# Patient Record
Sex: MALE | Race: WHITE | NOT HISPANIC OR LATINO | Employment: FULL TIME | URBAN - NONMETROPOLITAN AREA
[De-identification: names, ages, dates, MRNs, and addresses within clinical notes are randomized per-mention and may not be internally consistent; named-entity substitution may affect disease eponyms.]

---

## 2017-02-01 ENCOUNTER — NON-PROVIDER VISIT (OUTPATIENT)
Dept: URGENT CARE | Facility: PHYSICIAN GROUP | Age: 27
End: 2017-02-01

## 2017-02-01 DIAGNOSIS — Z02.1 PRE-EMPLOYMENT DRUG SCREENING: ICD-10-CM

## 2017-02-01 LAB
AMP AMPHETAMINE: NORMAL
COC COCAINE: NORMAL
INT CON NEG: NEGATIVE
INT CON POS: POSITIVE
MET METHAMPHETAMINES: NORMAL
OPI OPIATES: NORMAL
PCP PHENCYCLIDINE: NORMAL
POC DRUG COMMENT 753798-OCCUPATIONAL HEALTH: NORMAL
THC: NORMAL

## 2017-02-01 PROCEDURE — 80305 DRUG TEST PRSMV DIR OPT OBS: CPT | Performed by: PHYSICIAN ASSISTANT

## 2017-03-08 ENCOUNTER — NON-PROVIDER VISIT (OUTPATIENT)
Dept: URGENT CARE | Facility: PHYSICIAN GROUP | Age: 27
End: 2017-03-08

## 2017-03-08 DIAGNOSIS — Z00.8 ENCOUNTER FOR PULMONARY FUNCTION TESTING: ICD-10-CM

## 2017-03-08 DIAGNOSIS — Z01.10 ENCOUNTER FOR HEARING EXAMINATION: ICD-10-CM

## 2017-03-08 DIAGNOSIS — Z23 NEED FOR HEPATITIS B VACCINATION: ICD-10-CM

## 2017-03-08 PROCEDURE — 94010 BREATHING CAPACITY TEST: CPT | Performed by: PHYSICIAN ASSISTANT

## 2017-03-08 PROCEDURE — 99203 OFFICE O/P NEW LOW 30 MIN: CPT | Performed by: PHYSICIAN ASSISTANT

## 2017-03-08 PROCEDURE — 90746 HEPB VACCINE 3 DOSE ADULT IM: CPT | Performed by: PHYSICIAN ASSISTANT

## 2017-03-08 PROCEDURE — 92553 AUDIOMETRY AIR & BONE: CPT | Performed by: PHYSICIAN ASSISTANT

## 2017-03-08 NOTE — MR AVS SNAPSHOT
Jony Begum Mary   3/8/2017 10:30 AM   Non-Provider Visit   MRN: [id number]    Department:  [facility]   Dept Phone:  [phone]    Description:  Male : 1990   Provider:  Isela Lay PA-C           Reason for Visit     Other           Allergies as of 3/8/2017     No Known Allergies      You were diagnosed with     Encounter for hearing examination   [722480]       Encounter for pulmonary function testing   [[id number]]   Spirometry only.    Need for hepatitis B vaccination   [[id number]]         Vital Signs     Smoking Status                   Current Some Day Smoker           Basic Information     Date Of Birth Sex Race Ethnicity Preferred Language    1990 Male White Non- English      Health Maintenance        Date Due Completion Dates    IMM DTaP/Tdap/Td Vaccine (1 - Tdap) 2007 ---    IMM INFLUENZA (1) 2016 ---            Current Immunizations     Hepatitis B Vaccine Recombivax (Adol/Adult) 3/8/2017      Below and/or attached are the medications your provider expects you to take. Review all of your home medications and newly ordered medications with your provider and/or pharmacist. Follow medication instructions as directed by your provider and/or pharmacist. Please keep your medication list with you and share with your provider. Update the information when medications are discontinued, doses are changed, or new medications (including over-the-counter products) are added; and carry medication information at all times in the event of emergency situations     Allergies:  No Known Allergies          Medications  Valid as of: 2017 -  1:41 PM    Generic Name Brand Name Tablet Size Instructions for use    .                 Medicines prescribed today were sent to:     [facility] [address] [place] [address]    Phone: [phone] Fax: [phone]    Open 24 Hours?: No      Medication  refill instructions:       If your prescription bottle indicates you have medication refills left, it is not necessary to call your provider’s office. Please contact your pharmacy and they will refill your medication.    If your prescription bottle indicates you do not have any refills left, you may request refills at any time through one of the following ways: The online [employer] system (except Urgent Care), by calling your provider’s office, or by asking your pharmacy to contact your provider’s office with a refill request. Medication refills are processed only during regular business hours and may not be available until the next business day. Your provider may request additional information or to have a follow-up visit with you prior to refilling your medication.   *Please Note: Medication refills are assigned a new Rx number when refilled electronically. Your pharmacy may indicate that no refills were authorized even though a new prescription for the same medication is available at the pharmacy. Please request the medicine by name with the pharmacy before contacting your provider for a refill.           [employer] Access Code: [email or web address]  Expires: 4/7/2017  1:41 PM    [employer]  A secure, online tool to manage your health information     [facility]’s [employer]® is a secure, online tool that connects you to your personalized health information from the privacy of your home -- day or night - making it very easy for you to manage your healthcare. Once the activation process is completed, you can even access your medical information using the [employer] kimberly, which is available for free in the Apple Kimberly store or Google Play store.     [employer] provides the following levels of access (as shown below):   My Chart Features   [facility] Doctor [facility]  Specialists [facility]  Urgent  Care Non-[facility]  Primary Care  Doctor   Email your healthcare team securely and privately 24/7 X X X    Manage appointments: schedule  your next appointment; view details of past/upcoming appointments X      Request prescription refills. X      View recent personal medical records, including lab and immunizations X X X X   View health record, including health history, allergies, medications X X X X   Read reports about your outpatient visits, procedures, consult and ER notes X X X X   See your discharge summary, which is a recap of your hospital and/or ER visit that includes your diagnosis, lab results, and care plan. X X       How to register for [employer]:  1. Go to  [email or web address].  2. Click on the Sign Up Now box, which takes you to the New Member Sign Up page. You will need to provide the following information:  a. Enter your [employer] Access Code exactly as it appears at the top of this page. (You will not need to use this code after you’ve completed the sign-up process. If you do not sign up before the expiration date, you must request a new code.)   b. Enter your date of birth.   c. Enter your home email address.   d. Click Submit, and follow the next screen’s instructions.  3. Create a [employer] ID. This will be your [employer] login ID and cannot be changed, so think of one that is secure and easy to remember.  4. Create a [employer] password. You can change your password at any time.  5. Enter your Password Reset Question and Answer. This can be used at a later time if you forget your password.   6. Enter your e-mail address. This allows you to receive e-mail notifications when new information is available in [employer].  7. Click Sign Up. You can now view your health information.    For assistance activating your [employer] account, call [phone]

## 2017-03-08 NOTE — PROGRESS NOTES
No chief complaint on file.      HISTORY OF PRESENT ILLNESS: Patient is a 26 y.o. male who presents today for audiometric testing and spirometry only. Patient denies any issues at this time. Patient does report a history of hearing loss in both ears.    There are no active problems to display for this patient.      Allergies:Review of patient's allergies indicates no known allergies.    No current Epic-ordered outpatient prescriptions on file.     No current Epic-ordered facility-administered medications on file.       No past medical history on file.    Social History   Substance Use Topics   • Smoking status: Current Some Day Smoker -- 0.25 packs/day     Types: Cigarettes   • Smokeless tobacco: Not on file   • Alcohol Use: Yes      Comment: Rarely       Family Status   Relation Status Death Age   • Mother Alive    • Father Alive    No family history on file.    ROS:   Review of Systems   Constitutional: Negative for fever, chills, weight loss and malaise/fatigue.   HENT: Negative for ear pain, nosebleeds, congestion, sore throat and neck pain.    Eyes: Negative for blurred vision.   Respiratory: Negative for cough, sputum production, shortness of breath and wheezing.    Cardiovascular: Negative for chest pain, palpitations, orthopnea and leg swelling.   Gastrointestinal: Negative for heartburn, nausea, vomiting and abdominal pain.   Genitourinary: Negative for dysuria, urgency and frequency.       Exam:  There were no vitals taken for this visit.  General: Normal appearing. No distress.  HEENT:  Bilateral EACs and TMs are within normal limits.  Pulmonary: Clear to ausculation and percussion.  Normal effort. No rales, ronchi, or wheezing.   Psych: Normal mood. Alert and oriented x3. Judgment and insight is normal.    Audiometric testing, per my interpretation: High frequency hearing loss noted in both ears. No significant change from last year's testing. Recommend wearing ear protection about 85 dB but also with  any chronic noise.    Spirometry, per my interpretation: Acceptable range,  no accommodation needed.    Assessment/Plan:  Clear for work with the above recommendations.  1. Encounter for hearing examination     2. Encounter for pulmonary function testing      Spirometry only.

## 2017-08-02 ENCOUNTER — OCCUPATIONAL MEDICINE (OUTPATIENT)
Dept: URGENT CARE | Facility: PHYSICIAN GROUP | Age: 27
End: 2017-08-02

## 2017-08-02 DIAGNOSIS — Z01.10 ENCOUNTER FOR HEARING EVALUATION: ICD-10-CM

## 2017-08-02 PROCEDURE — 92553 AUDIOMETRY AIR & BONE: CPT | Performed by: PHYSICIAN ASSISTANT

## 2017-08-02 NOTE — PROGRESS NOTES
Patient here for repeat of audiogram testing    He denies notable change in hearing. He mentions that occasionally things seemed slightly softer and left ear. He states this has been baseline for long-term. He notes his left ear does have a propensity to build up ear wax. He denies ear pain. Denies fever, chills.    He states that he always uses her protection at work.    Audiogram shows slight change with slight decrease and left ear hearing. No change in right ear. There is no significant threshold shift.    Exam of bilateral ears is normal. External auditory canal bilaterally clear with no cerumen debris or edema. TM slightly bulging on left non-erythematous bilaterally.     Continue current annual follow-up for repeat audiogram. Continue use of ear protection for all loud noises.

## 2017-08-02 NOTE — MR AVS SNAPSHOT
Jony Begum Mary   2017 1:45 PM   Appointment   MRN: [id number]    Department:  [facility]   Dept Phone:  [phone]    Description:  Male : 1990   Provider:  Jaskaran Garcia PA-C           Allergies as of 2017     No Known Allergies      Vital Signs     Smoking Status                   Current Some Day Smoker           Basic Information     Date Of Birth Sex Race Ethnicity Preferred Language    1990 Male White Non- English      Health Maintenance        Date Due Completion Dates    IMM DTaP/Tdap/Td Vaccine (1 - Tdap) 2007 ---    IMM INFLUENZA (1) 2017 ---            Current Immunizations     Hepatitis B Vaccine Recombivax (Adol/Adult) 3/8/2017      Below and/or attached are the medications your provider expects you to take. Review all of your home medications and newly ordered medications with your provider and/or pharmacist. Follow medication instructions as directed by your provider and/or pharmacist. Please keep your medication list with you and share with your provider. Update the information when medications are discontinued, doses are changed, or new medications (including over-the-counter products) are added; and carry medication information at all times in the event of emergency situations     Allergies:  No Known Allergies          Medications  Valid as of: 2017 -  2:09 PM    Generic Name Brand Name Tablet Size Instructions for use    .                 Medicines prescribed today were sent to:     [facility] [address] [place] [id number]    Phone: [phone] Fax: [phone]    Open 24 Hours?: No      Medication refill instructions:       If your prescription bottle indicates you have medication refills left, it is not necessary to call your provider’s office. Please contact your pharmacy and they will refill your medication.    If your prescription bottle  indicates you do not have any refills left, you may request refills at any time through one of the following ways: The online [employer] system (except Urgent Care), by calling your provider’s office, or by asking your pharmacy to contact your provider’s office with a refill request. Medication refills are processed only during regular business hours and may not be available until the next business day. Your provider may request additional information or to have a follow-up visit with you prior to refilling your medication.   *Please Note: Medication refills are assigned a new Rx number when refilled electronically. Your pharmacy may indicate that no refills were authorized even though a new prescription for the same medication is available at the pharmacy. Please request the medicine by name with the pharmacy before contacting your provider for a refill.           [employer] Access Code: [email or web address]  Expires: 9/1/2017  2:09 PM    [employer]  A secure, online tool to manage your health information     [employer]’s [employer]® is a secure, online tool that connects you to your personalized health information from the privacy of your home -- day or night - making it very easy for you to manage your healthcare. Once the activation process is completed, you can even access your medical information using the [employer] kimberly, which is available for free in the [employer] store or Google Play store.     [employer] provides the following levels of access (as shown below):   My Chart Features   [facility] Doctor [facility]  Specialists [facility]  Urgent  Care Non-[facility]  Primary Care  Doctor   Email your healthcare team securely and privately 24/7 X X X    Manage appointments: schedule your next appointment; view details of past/upcoming appointments X      Request prescription refills. X      View recent personal medical records, including lab and immunizations X X X X   View health record, including health history, allergies,  medications X X X X   Read reports about your outpatient visits, procedures, consult and ER notes X X X X   See your discharge summary, which is a recap of your hospital and/or ER visit that includes your diagnosis, lab results, and care plan. X X       How to register for [employer]:  1. Go to  [email or web address].  2. Click on the Sign Up Now box, which takes you to the New Member Sign Up page. You will need to provide the following information:  a. Enter your [employer] Access Code exactly as it appears at the top of this page. (You will not need to use this code after you’ve completed the sign-up process. If you do not sign up before the expiration date, you must request a new code.)   b. Enter your date of birth.   c. Enter your home email address.   d. Click Submit, and follow the next screen’s instructions.  3. Create a [employer] ID. This will be your [employer] login ID and cannot be changed, so think of one that is secure and easy to remember.  4. Create a [employer] password. You can change your password at any time.  5. Enter your Password Reset Question and Answer. This can be used at a later time if you forget your password.   6. Enter your e-mail address. This allows you to receive e-mail notifications when new information is available in [employer].  7. Click Sign Up. You can now view your health information.    For assistance activating your [employer] account, call [phone]

## 2018-01-02 ENCOUNTER — NON-PROVIDER VISIT (OUTPATIENT)
Dept: OCCUPATIONAL MEDICINE | Facility: CLINIC | Age: 28
End: 2018-01-02

## 2018-01-02 DIAGNOSIS — Z02.1 PRE-EMPLOYMENT DRUG SCREENING: ICD-10-CM

## 2018-01-02 PROCEDURE — 80305 DRUG TEST PRSMV DIR OPT OBS: CPT | Performed by: INTERNAL MEDICINE

## 2018-01-11 LAB
AMP AMPHETAMINE: NORMAL
COC COCAINE: NORMAL
INT CON NEG: NORMAL
INT CON POS: NORMAL
MET METHAMPHETAMINES: NORMAL
OPI OPIATES: NORMAL
PCP PHENCYCLIDINE: NORMAL
POC DRUG COMMENT 753798-OCCUPATIONAL HEALTH: NEGATIVE
THC: NORMAL

## 2018-01-24 ENCOUNTER — NON-PROVIDER VISIT (OUTPATIENT)
Dept: OCCUPATIONAL MEDICINE | Facility: CLINIC | Age: 28
End: 2018-01-24

## 2018-01-24 DIAGNOSIS — Z02.1 PRE-EMPLOYMENT DRUG SCREENING: ICD-10-CM

## 2018-01-24 PROCEDURE — 80305 DRUG TEST PRSMV DIR OPT OBS: CPT | Performed by: INTERNAL MEDICINE

## 2018-01-24 PROCEDURE — 8895 PB URINE 6 PANEL AFTER HOURS: Performed by: INTERNAL MEDICINE

## 2019-01-09 ENCOUNTER — OFFICE VISIT (OUTPATIENT)
Dept: URGENT CARE | Facility: PHYSICIAN GROUP | Age: 29
End: 2019-01-09
Payer: MEDICAID

## 2019-01-09 VITALS
BODY MASS INDEX: 29.2 KG/M2 | HEART RATE: 79 BPM | RESPIRATION RATE: 16 BRPM | OXYGEN SATURATION: 98 % | DIASTOLIC BLOOD PRESSURE: 72 MMHG | SYSTOLIC BLOOD PRESSURE: 118 MMHG | TEMPERATURE: 97.9 F | WEIGHT: 204 LBS | HEIGHT: 70 IN

## 2019-01-09 DIAGNOSIS — J06.9 URI WITH COUGH AND CONGESTION: ICD-10-CM

## 2019-01-09 DIAGNOSIS — L73.9 FOLLICULITIS: ICD-10-CM

## 2019-01-09 DIAGNOSIS — J02.9 EXUDATIVE PHARYNGITIS: Primary | ICD-10-CM

## 2019-01-09 DIAGNOSIS — J01.40 ACUTE NON-RECURRENT PANSINUSITIS: ICD-10-CM

## 2019-01-09 DIAGNOSIS — Q82.9 KERATOSIS PILARIS, CONGENITAL: ICD-10-CM

## 2019-01-09 PROCEDURE — 99214 OFFICE O/P EST MOD 30 MIN: CPT | Performed by: PHYSICIAN ASSISTANT

## 2019-01-09 RX ORDER — METHYLPREDNISOLONE 4 MG/1
TABLET ORAL
Qty: 21 TAB | Refills: 0 | Status: SHIPPED | OUTPATIENT
Start: 2019-01-09 | End: 2019-03-12

## 2019-01-09 RX ORDER — AMOXICILLIN AND CLAVULANATE POTASSIUM 875; 125 MG/1; MG/1
1 TABLET, FILM COATED ORAL 2 TIMES DAILY
Qty: 14 TAB | Refills: 0 | Status: SHIPPED | OUTPATIENT
Start: 2019-01-09 | End: 2019-01-16

## 2019-01-09 NOTE — PATIENT INSTRUCTIONS
Folliculitis  Folliculitis is redness, soreness, and swelling (inflammation) of the hair follicles. This condition can occur anywhere on the body. People with weakened immune systems, diabetes, or obesity have a greater risk of getting folliculitis.  CAUSES  · Bacterial infection. This is the most common cause.  · Fungal infection.  · Viral infection.  · Contact with certain chemicals, especially oils and tars.  Long-term folliculitis can result from bacteria that live in the nostrils. The bacteria may trigger multiple outbreaks of folliculitis over time.  SYMPTOMS  Folliculitis most commonly occurs on the scalp, thighs, legs, back, buttocks, and areas where hair is shaved frequently. An early sign of folliculitis is a small, white or yellow, pus-filled, itchy lesion (pustule). These lesions appear on a red, inflamed follicle. They are usually less than 0.2 inches (5 mm) wide. When there is an infection of the follicle that goes deeper, it becomes a boil or furuncle. A group of closely packed boils creates a larger lesion (carbuncle). Carbuncles tend to occur in hairy, sweaty areas of the body.  DIAGNOSIS   Your caregiver can usually tell what is wrong by doing a physical exam. A sample may be taken from one of the lesions and tested in a lab. This can help determine what is causing your folliculitis.  TREATMENT   Treatment may include:  · Applying warm compresses to the affected areas.  · Taking antibiotic medicines orally or applying them to the skin.  · Draining the lesions if they contain a large amount of pus or fluid.  · Laser hair removal for cases of long-lasting folliculitis. This helps to prevent regrowth of the hair.  HOME CARE INSTRUCTIONS  · Apply warm compresses to the affected areas as directed by your caregiver.  · If antibiotics are prescribed, take them as directed. Finish them even if you start to feel better.  · You may take over-the-counter medicines to relieve itching.  · Do not shave irritated  skin.  · Follow up with your caregiver as directed.  SEEK IMMEDIATE MEDICAL CARE IF:   · You have increasing redness, swelling, or pain in the affected area.  · You have a fever.  MAKE SURE YOU:  · Understand these instructions.  · Will watch your condition.  · Will get help right away if you are not doing well or get worse.  This information is not intended to replace advice given to you by your health care provider. Make sure you discuss any questions you have with your health care provider.  Document Released: 02/26/2003 Document Revised: 01/08/2016 Document Reviewed: 10/07/2016  Elsevier Interactive Patient Education © 2017 Elsevier Inc.

## 2019-01-09 NOTE — PROGRESS NOTES
Subjective:      Pt is a 28 y.o. male who presents with Pharyngitis (2-3 days); Runny Nose; and Rash (on his legs )            HPI  This is a new problem. PT presents to  clinic today complaining of sore throat, watery eyes, pressure in ears, cough, fatigue, runny nose, post nasal drip, sinus pressure and headache. PT denies CP, SOB, NVD, abdominal pain, joint pain. PT states these symptoms began around 2 days ago. PT states the pain is a 7/10 with swallowing, aching in nature and worse at night.  Pt has not taken any RX medications for this condition. Pt also notes pimples on legs x 1 yr. The pt's medication list, problem list, and allergies have been evaluated and reviewed during today's visit.    PMH:  Negative per pt.      PSH:  Negative per pt.      Fam Hx:  the patient's family history is not pertinent to their current complaint    Family Status   Relation Status   • Mo Alive   • Fa Alive       Soc HX:  Social History     Social History   • Marital status: Single     Spouse name: N/A   • Number of children: N/A   • Years of education: N/A     Occupational History   • Not on file.     Social History Main Topics   • Smoking status: Current Some Day Smoker     Packs/day: 0.25     Types: Cigarettes   • Smokeless tobacco: Never Used   • Alcohol use Yes      Comment: Rarely   • Drug use: No   • Sexual activity: Yes     Partners: Female     Other Topics Concern   • Not on file     Social History Narrative   • No narrative on file         Medications:    Current Outpatient Prescriptions:   •  amoxicillin-clavulanate (AUGMENTIN) 875-125 MG Tab, Take 1 Tab by mouth 2 times a day for 7 days., Disp: 14 Tab, Rfl: 0  •  MethylPREDNISolone (MEDROL DOSEPAK) 4 MG Tablet Therapy Pack, Use as directed, Disp: 21 Tab, Rfl: 0      Allergies:  Patient has no known allergies.    ROS  Review of Systems   Constitutional: Positive for malaise/fatigue. Negative for fever.   HENT: Positive for congestion and sore throat from postnasal  "drip with associated sinus pressure and fullness.    Eyes: Negative for blurred vision, double vision and photophobia.   Respiratory: Positive for cough and sputum production. Negative for hemoptysis, shortness of breath and wheezing.    Cardiovascular: Negative for chest pain and palpitations.   Gastrointestinal: Negative for nausea, vomiting, abdominal pain, diarrhea and constipation.   Genitourinary: Negative for dysuria and flank pain.   Musculoskeletal: Negative for joint pain and myalgias.   Skin: POS for pimples on legs.   Neurological: Positive for headaches. Negative for dizziness and tingling.   Endo/Heme/Allergies: Does not bruise/bleed easily.   Psychiatric/Behavioral: Negative for depression. The patient is not nervous/anxious.           Objective:     /72   Pulse 79   Temp 36.6 °C (97.9 °F) (Temporal)   Resp 16   Ht 1.765 m (5' 9.5\")   Wt 92.5 kg (204 lb)   SpO2 98%   BMI 29.69 kg/m²      Physical Exam   Skin: Skin is warm. Capillary refill takes less than 2 seconds. Rash noted. Rash is pustular. No cyanosis. Nails show no clubbing.          Constitutional: PT is oriented to person, place, and time. PT appears well-developed and well-nourished. No distress.   HENT:   Head: Normocephalic and atraumatic.   Right Ear: Hearing, tympanic membrane, external ear and ear canal normal.   Left Ear: Hearing, tympanic membrane, external ear and ear canal normal.   Nose: Mucosal edema, rhinorrhea and sinus tenderness present. Right sinus exhibits frontal sinus tenderness. Left sinus exhibits frontal sinus tenderness.   Mouth/Throat: Uvula is midline. Mucous membranes are pale. Posterior oropharyngeal edema and posterior oropharyngeal erythema with exudate noted on exam.   Eyes: Conjunctivae normal and EOM are normal. Pupils are equal, round, and reactive to light.   Neck: Normal range of motion. Neck supple. No thyromegaly present.   Cardiovascular: Normal rate, regular rhythm, normal heart sounds and " intact distal pulses.  Exam reveals no gallop and no friction rub.    No murmur heard.  Pulmonary/Chest: Effort normal and breath sounds normal. No respiratory distress. PT has no wheezes. PT has no rales. PT exhibits no tenderness.   Abdominal: Soft. Bowel sounds are normal. PT exhibits no distension and no mass. There is no tenderness. There is no rebound and no guarding.   Musculoskeletal: Normal range of motion. PT exhibits no edema and no tenderness.   Lymphadenopathy:     PT has no cervical adenopathy.   Neurological: PT is alert and oriented to person, place, and time. PT displays normal reflexes. No cranial nerve deficit. PT exhibits normal muscle tone. Coordination normal.   Psychiatric: PT has a normal mood and affect. PT behavior is normal. Judgment and thought content normal.             Assessment/Plan:     1. Exudative pharyngitis  - pt declines poc swab due to intolerance form throat pain  - amoxicillin-clavulanate (AUGMENTIN) 875-125 MG Tab; Take 1 Tab by mouth 2 times a day for 7 days.  Dispense: 14 Tab; Refill: 0    2. Acute non-recurrent pansinusitis    - amoxicillin-clavulanate (AUGMENTIN) 875-125 MG Tab; Take 1 Tab by mouth 2 times a day for 7 days.  Dispense: 14 Tab; Refill: 0  - MethylPREDNISolone (MEDROL DOSEPAK) 4 MG Tablet Therapy Pack; Use as directed  Dispense: 21 Tab; Refill: 0    3. URI with cough and congestion    - MethylPREDNISolone (MEDROL DOSEPAK) 4 MG Tablet Therapy Pack; Use as directed  Dispense: 21 Tab; Refill: 0    4. Keratosis pilaris, congenital    - MethylPREDNISolone (MEDROL DOSEPAK) 4 MG Tablet Therapy Pack; Use as directed  Dispense: 21 Tab; Refill: 0    5. Folliculitis    - MethylPREDNISolone (MEDROL DOSEPAK) 4 MG Tablet Therapy Pack; Use as directed  Dispense: 21 Tab; Refill: 0    Pt notes hx of this skin issue (Keratosis pilaris) since childhood but folliculitis is newer x 1 year  Rest, fluids encouraged.  OTC decongestant for congestion/cough  AVS with medical info  given.  Pt was in full understanding and agreement with the plan.  Follow-up as needed if symptoms worsen or fail to improve.

## 2019-03-12 ENCOUNTER — OFFICE VISIT (OUTPATIENT)
Dept: URGENT CARE | Facility: CLINIC | Age: 29
End: 2019-03-12

## 2019-03-12 ENCOUNTER — HOSPITAL ENCOUNTER (OUTPATIENT)
Facility: MEDICAL CENTER | Age: 29
End: 2019-03-12
Attending: NURSE PRACTITIONER
Payer: COMMERCIAL

## 2019-03-12 VITALS
HEIGHT: 70 IN | WEIGHT: 186 LBS | BODY MASS INDEX: 26.63 KG/M2 | OXYGEN SATURATION: 98 % | SYSTOLIC BLOOD PRESSURE: 118 MMHG | DIASTOLIC BLOOD PRESSURE: 80 MMHG | HEART RATE: 86 BPM | RESPIRATION RATE: 16 BRPM | TEMPERATURE: 97.8 F

## 2019-03-12 DIAGNOSIS — Z02.1 PRE-EMPLOYMENT DRUG SCREENING: ICD-10-CM

## 2019-03-12 DIAGNOSIS — Z02.89 ENCOUNTER FOR PHYSICAL EXAMINATION RELATED TO EMPLOYMENT: ICD-10-CM

## 2019-03-12 DIAGNOSIS — Z77.011 LEAD EXPOSURE: ICD-10-CM

## 2019-03-12 LAB
ALBUMIN SERPL BCP-MCNC: 4.8 G/DL (ref 3.2–4.9)
ALBUMIN/GLOB SERPL: 1.8 G/DL
ALP SERPL-CCNC: 89 U/L (ref 30–99)
ALT SERPL-CCNC: 16 U/L (ref 2–50)
AMP AMPHETAMINE: NORMAL
ANION GAP SERPL CALC-SCNC: 8 MMOL/L (ref 0–11.9)
APPEARANCE UR: CLEAR
AST SERPL-CCNC: 21 U/L (ref 12–45)
BACTERIA #/AREA URNS HPF: NEGATIVE /HPF
BASOPHILS # BLD AUTO: 0.4 % (ref 0–1.8)
BASOPHILS # BLD: 0.04 K/UL (ref 0–0.12)
BILIRUB SERPL-MCNC: 0.5 MG/DL (ref 0.1–1.5)
BILIRUB UR QL STRIP.AUTO: NEGATIVE
BUN SERPL-MCNC: 13 MG/DL (ref 8–22)
CALCIUM SERPL-MCNC: 9.8 MG/DL (ref 8.5–10.5)
CHLORIDE SERPL-SCNC: 107 MMOL/L (ref 96–112)
CO2 SERPL-SCNC: 27 MMOL/L (ref 20–33)
COC COCAINE: NORMAL
COLOR UR: YELLOW
CREAT SERPL-MCNC: 1.2 MG/DL (ref 0.5–1.4)
EOSINOPHIL # BLD AUTO: 0.11 K/UL (ref 0–0.51)
EOSINOPHIL NFR BLD: 1.2 % (ref 0–6.9)
EPI CELLS #/AREA URNS HPF: NEGATIVE /HPF
ERYTHROCYTE [DISTWIDTH] IN BLOOD BY AUTOMATED COUNT: 52.4 FL (ref 35.9–50)
GLOBULIN SER CALC-MCNC: 2.6 G/DL (ref 1.9–3.5)
GLUCOSE SERPL-MCNC: 90 MG/DL (ref 65–99)
GLUCOSE UR STRIP.AUTO-MCNC: NEGATIVE MG/DL
HCT VFR BLD AUTO: 58.2 % (ref 42–52)
HGB BLD-MCNC: 19.2 G/DL (ref 14–18)
HYALINE CASTS #/AREA URNS LPF: ABNORMAL /LPF
IMM GRANULOCYTES # BLD AUTO: 0.02 K/UL (ref 0–0.11)
IMM GRANULOCYTES NFR BLD AUTO: 0.2 % (ref 0–0.9)
INT CON NEG: NORMAL
INT CON POS: NORMAL
KETONES UR STRIP.AUTO-MCNC: NEGATIVE MG/DL
LEUKOCYTE ESTERASE UR QL STRIP.AUTO: NEGATIVE
LYMPHOCYTES # BLD AUTO: 2.35 K/UL (ref 1–4.8)
LYMPHOCYTES NFR BLD: 25.1 % (ref 22–41)
MCH RBC QN AUTO: 31.4 PG (ref 27–33)
MCHC RBC AUTO-ENTMCNC: 33 G/DL (ref 33.7–35.3)
MCV RBC AUTO: 95.3 FL (ref 81.4–97.8)
MET METHAMPHETAMINES: NORMAL
MONOCYTES # BLD AUTO: 0.68 K/UL (ref 0–0.85)
MONOCYTES NFR BLD AUTO: 7.3 % (ref 0–13.4)
NEUTROPHILS # BLD AUTO: 6.17 K/UL (ref 1.82–7.42)
NEUTROPHILS NFR BLD: 65.8 % (ref 44–72)
NITRITE UR QL STRIP.AUTO: NEGATIVE
NRBC # BLD AUTO: 0 K/UL
NRBC BLD-RTO: 0 /100 WBC
OPI OPIATES: NORMAL
PCP PHENCYCLIDINE: NORMAL
PH UR STRIP.AUTO: 6 [PH]
PLATELET # BLD AUTO: 204 K/UL (ref 164–446)
PMV BLD AUTO: 12 FL (ref 9–12.9)
POC DRUG COMMENT 753798-OCCUPATIONAL HEALTH: NORMAL
POTASSIUM SERPL-SCNC: 4.2 MMOL/L (ref 3.6–5.5)
PROT SERPL-MCNC: 7.4 G/DL (ref 6–8.2)
PROT UR QL STRIP: NEGATIVE MG/DL
RBC # BLD AUTO: 6.11 M/UL (ref 4.7–6.1)
RBC # URNS HPF: ABNORMAL /HPF
RBC UR QL AUTO: NEGATIVE
SODIUM SERPL-SCNC: 142 MMOL/L (ref 135–145)
SP GR UR STRIP.AUTO: 1.02
THC: NORMAL
UROBILINOGEN UR STRIP.AUTO-MCNC: 0.2 MG/DL
WBC # BLD AUTO: 9.4 K/UL (ref 4.8–10.8)
WBC #/AREA URNS HPF: ABNORMAL /HPF

## 2019-03-12 PROCEDURE — 8915 PR COMPREHENSIVE PHYSICAL: Performed by: NURSE PRACTITIONER

## 2019-03-12 PROCEDURE — 80305 DRUG TEST PRSMV DIR OPT OBS: CPT | Performed by: NURSE PRACTITIONER

## 2019-03-12 NOTE — PROGRESS NOTES
28 y.o. male comes in for a preemployment physical. No major medical history, no chronic conditions, no chronic medications. No history of asthma, heart disease, murmurs, seizure disorder or syncopal episodes with activity.  Please see the chart for further information, however normal physical exam, cleared for employment without restrictions. Awaiting lab work.       MARIA Hernández.

## 2019-03-15 LAB
LEAD BLD-MCNC: <2 UG/DL (ref 0–4.9)
ZPP RBC-MCNC: 16 UG/DL (ref 0–40)
ZPP RBC-SRTO: 28 UMOLZPP/MOLHEM (ref 0–69)

## 2019-03-20 ENCOUNTER — APPOINTMENT (OUTPATIENT)
Dept: URGENT CARE | Facility: CLINIC | Age: 29
End: 2019-03-20

## 2021-11-15 ENCOUNTER — OFFICE VISIT (OUTPATIENT)
Dept: BEHAVIORAL HEALTH | Facility: CLINIC | Age: 31
End: 2021-11-15

## 2021-11-15 DIAGNOSIS — F33.1 MODERATE EPISODE OF RECURRENT MAJOR DEPRESSIVE DISORDER (HCC): ICD-10-CM

## 2021-11-15 DIAGNOSIS — F41.1 GENERALIZED ANXIETY DISORDER: ICD-10-CM

## 2021-11-15 DIAGNOSIS — Z63.9 RELATIONSHIP PROBLEMS: ICD-10-CM

## 2021-11-15 PROCEDURE — 90792 PSYCH DIAG EVAL W/MED SRVCS: CPT | Mod: GC | Performed by: PSYCHIATRY & NEUROLOGY

## 2021-11-15 RX ORDER — FLUOXETINE HYDROCHLORIDE 20 MG/1
20 CAPSULE ORAL DAILY
Qty: 30 CAPSULE | Refills: 2 | Status: SHIPPED | OUTPATIENT
Start: 2021-11-15

## 2021-11-15 SDOH — SOCIAL STABILITY - SOCIAL INSECURITY: PROBLEM RELATED TO PRIMARY SUPPORT GROUP, UNSPECIFIED: Z63.9

## 2021-11-15 ASSESSMENT — ANXIETY QUESTIONNAIRES
7. FEELING AFRAID AS IF SOMETHING AWFUL MIGHT HAPPEN: NOT AT ALL
2. NOT BEING ABLE TO STOP OR CONTROL WORRYING: NOT AT ALL
IF YOU CHECKED OFF ANY PROBLEMS ON THIS QUESTIONNAIRE, HOW DIFFICULT HAVE THESE PROBLEMS MADE IT FOR YOU TO DO YOUR WORK, TAKE CARE OF THINGS AT HOME, OR GET ALONG WITH OTHER PEOPLE: SOMEWHAT DIFFICULT
3. WORRYING TOO MUCH ABOUT DIFFERENT THINGS: NOT AT ALL
5. BEING SO RESTLESS THAT IT IS HARD TO SIT STILL: SEVERAL DAYS
GAD7 TOTAL SCORE: 4
4. TROUBLE RELAXING: SEVERAL DAYS
6. BECOMING EASILY ANNOYED OR IRRITABLE: SEVERAL DAYS
1. FEELING NERVOUS, ANXIOUS, OR ON EDGE: SEVERAL DAYS

## 2021-11-15 ASSESSMENT — PATIENT HEALTH QUESTIONNAIRE - PHQ9
5. POOR APPETITE OR OVEREATING: 1 - SEVERAL DAYS
CLINICAL INTERPRETATION OF PHQ2 SCORE: 2
SUM OF ALL RESPONSES TO PHQ QUESTIONS 1-9: 6
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
## 2023-08-17 ENCOUNTER — OFFICE VISIT (OUTPATIENT)
Dept: URGENT CARE | Facility: PHYSICIAN GROUP | Age: 33
End: 2023-08-17
Payer: MEDICAID

## 2023-08-17 ENCOUNTER — APPOINTMENT (OUTPATIENT)
Dept: RADIOLOGY | Facility: IMAGING CENTER | Age: 33
End: 2023-08-17
Attending: PHYSICIAN ASSISTANT
Payer: MEDICAID

## 2023-08-17 VITALS
OXYGEN SATURATION: 98 % | HEART RATE: 68 BPM | TEMPERATURE: 97.1 F | WEIGHT: 171 LBS | SYSTOLIC BLOOD PRESSURE: 126 MMHG | BODY MASS INDEX: 24.54 KG/M2 | DIASTOLIC BLOOD PRESSURE: 84 MMHG | RESPIRATION RATE: 14 BRPM

## 2023-08-17 DIAGNOSIS — M79.641 RIGHT HAND PAIN: ICD-10-CM

## 2023-08-17 DIAGNOSIS — S62.364A CLOSED NONDISPLACED FRACTURE OF NECK OF FOURTH METACARPAL BONE OF RIGHT HAND, INITIAL ENCOUNTER: ICD-10-CM

## 2023-08-17 DIAGNOSIS — S62.346A CLOSED NONDISPLACED FRACTURE OF BASE OF FIFTH METACARPAL BONE OF RIGHT HAND, INITIAL ENCOUNTER: ICD-10-CM

## 2023-08-17 PROCEDURE — 73130 X-RAY EXAM OF HAND: CPT | Mod: TC,FY,RT | Performed by: PHYSICIAN ASSISTANT

## 2023-08-17 PROCEDURE — 3079F DIAST BP 80-89 MM HG: CPT | Performed by: PHYSICIAN ASSISTANT

## 2023-08-17 PROCEDURE — 99204 OFFICE O/P NEW MOD 45 MIN: CPT | Performed by: PHYSICIAN ASSISTANT

## 2023-08-17 PROCEDURE — 3074F SYST BP LT 130 MM HG: CPT | Performed by: PHYSICIAN ASSISTANT

## 2023-08-17 ASSESSMENT — ENCOUNTER SYMPTOMS
CHILLS: 0
FEVER: 0
SENSORY CHANGE: 0
VOMITING: 0
NAUSEA: 0
TINGLING: 0
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
## 2023-08-17 NOTE — LETTER
August 17, 2023         Patient: Jony Hernandez   YOB: 1990   Date of Visit: 8/17/2023           To Whom it May Concern:    Jony Hernandez was seen in my clinic on 8/17/2023.  He is being treated for a fracture of his right hand.  He will need to keep the splint on at all times and should not have any use of his right hand until cleared by orthopedics.      Sincerely,           Concha Ya P.A.-C.  Electronically Signed

## 2024-05-06 ENCOUNTER — HOSPITAL ENCOUNTER (OUTPATIENT)
Dept: LAB | Facility: MEDICAL CENTER | Age: 34
End: 2024-05-06
Attending: FAMILY MEDICINE
Payer: MEDICAID

## 2024-05-06 ENCOUNTER — OFFICE VISIT (OUTPATIENT)
Dept: URGENT CARE | Facility: PHYSICIAN GROUP | Age: 34
End: 2024-05-06
Payer: MEDICAID

## 2024-05-06 VITALS
TEMPERATURE: 96.8 F | HEART RATE: 82 BPM | BODY MASS INDEX: 23.19 KG/M2 | DIASTOLIC BLOOD PRESSURE: 80 MMHG | OXYGEN SATURATION: 98 % | WEIGHT: 162 LBS | RESPIRATION RATE: 16 BRPM | SYSTOLIC BLOOD PRESSURE: 124 MMHG | HEIGHT: 70 IN

## 2024-05-06 DIAGNOSIS — Z72.0 TOBACCO ABUSE: ICD-10-CM

## 2024-05-06 DIAGNOSIS — K21.9 GASTROESOPHAGEAL REFLUX DISEASE WITHOUT ESOPHAGITIS: ICD-10-CM

## 2024-05-06 DIAGNOSIS — K92.0 HEMATEMESIS, UNSPECIFIED WHETHER NAUSEA PRESENT: ICD-10-CM

## 2024-05-06 LAB
ALBUMIN SERPL BCP-MCNC: 4.7 G/DL (ref 3.2–4.9)
ALBUMIN/GLOB SERPL: 1.8 G/DL
ALP SERPL-CCNC: 90 U/L (ref 30–99)
ALT SERPL-CCNC: 14 U/L (ref 2–50)
ANION GAP SERPL CALC-SCNC: 12 MMOL/L (ref 7–16)
AST SERPL-CCNC: 22 U/L (ref 12–45)
BASOPHILS # BLD AUTO: 0.5 % (ref 0–1.8)
BASOPHILS # BLD: 0.04 K/UL (ref 0–0.12)
BILIRUB SERPL-MCNC: 0.3 MG/DL (ref 0.1–1.5)
BUN SERPL-MCNC: 16 MG/DL (ref 8–22)
CALCIUM ALBUM COR SERPL-MCNC: 9 MG/DL (ref 8.5–10.5)
CALCIUM SERPL-MCNC: 9.6 MG/DL (ref 8.5–10.5)
CHLORIDE SERPL-SCNC: 106 MMOL/L (ref 96–112)
CO2 SERPL-SCNC: 23 MMOL/L (ref 20–33)
CREAT SERPL-MCNC: 1.17 MG/DL (ref 0.5–1.4)
EOSINOPHIL # BLD AUTO: 0.11 K/UL (ref 0–0.51)
EOSINOPHIL NFR BLD: 1.5 % (ref 0–6.9)
ERYTHROCYTE [DISTWIDTH] IN BLOOD BY AUTOMATED COUNT: 45.1 FL (ref 35.9–50)
GFR SERPLBLD CREATININE-BSD FMLA CKD-EPI: 83 ML/MIN/1.73 M 2
GLOBULIN SER CALC-MCNC: 2.6 G/DL (ref 1.9–3.5)
GLUCOSE SERPL-MCNC: 89 MG/DL (ref 65–99)
HCT VFR BLD AUTO: 50.2 % (ref 42–52)
HGB BLD-MCNC: 17.3 G/DL (ref 14–18)
IMM GRANULOCYTES # BLD AUTO: 0.02 K/UL (ref 0–0.11)
IMM GRANULOCYTES NFR BLD AUTO: 0.3 % (ref 0–0.9)
LYMPHOCYTES # BLD AUTO: 2.37 K/UL (ref 1–4.8)
LYMPHOCYTES NFR BLD: 31.5 % (ref 22–41)
MCH RBC QN AUTO: 31.5 PG (ref 27–33)
MCHC RBC AUTO-ENTMCNC: 34.5 G/DL (ref 32.3–36.5)
MCV RBC AUTO: 91.4 FL (ref 81.4–97.8)
MONOCYTES # BLD AUTO: 0.6 K/UL (ref 0–0.85)
MONOCYTES NFR BLD AUTO: 8 % (ref 0–13.4)
NEUTROPHILS # BLD AUTO: 4.39 K/UL (ref 1.82–7.42)
NEUTROPHILS NFR BLD: 58.2 % (ref 44–72)
NRBC # BLD AUTO: 0 K/UL
NRBC BLD-RTO: 0 /100 WBC (ref 0–0.2)
PLATELET # BLD AUTO: 204 K/UL (ref 164–446)
PMV BLD AUTO: 11.1 FL (ref 9–12.9)
POTASSIUM SERPL-SCNC: 4.5 MMOL/L (ref 3.6–5.5)
PROT SERPL-MCNC: 7.3 G/DL (ref 6–8.2)
RBC # BLD AUTO: 5.49 M/UL (ref 4.7–6.1)
SODIUM SERPL-SCNC: 141 MMOL/L (ref 135–145)
WBC # BLD AUTO: 7.5 K/UL (ref 4.8–10.8)

## 2024-05-06 PROCEDURE — 3079F DIAST BP 80-89 MM HG: CPT | Performed by: FAMILY MEDICINE

## 2024-05-06 PROCEDURE — 3074F SYST BP LT 130 MM HG: CPT | Performed by: FAMILY MEDICINE

## 2024-05-06 PROCEDURE — 99214 OFFICE O/P EST MOD 30 MIN: CPT | Performed by: FAMILY MEDICINE

## 2024-05-06 RX ORDER — BUPROPION HYDROCHLORIDE 150 MG/1
150 TABLET, FILM COATED, EXTENDED RELEASE ORAL 2 TIMES DAILY
Qty: 60 TABLET | Refills: 0 | Status: SHIPPED | OUTPATIENT
Start: 2024-05-06

## 2024-05-06 RX ORDER — OMEPRAZOLE 20 MG/1
20 CAPSULE, DELAYED RELEASE ORAL DAILY
Qty: 30 CAPSULE | Refills: 0 | Status: SHIPPED | OUTPATIENT
Start: 2024-05-06

## 2024-05-06 NOTE — PROGRESS NOTES
"Subjective:   CC:  ABD PAIN            Abdominal Pain  This is a new problem. The current episode started 3 d ago.    Has hx GERD.     Had 5-6 beers 3 nights ago and woke up next morning with n/v and had hematemsis x 2.    \"Moderate\" blood.    Feels better currently, but still has mild epigastric pain, burning and poor appetite.    Denies n/v.         Associated symptoms include nausea. Pertinent negatives include no   constipation, diarrhea, dysuria, fever, hematochezia, hematuria,  or sore throat.          #2.  Current smoker:    1ppd x 10 yr.    He is ready to quit    Social History     Tobacco Use    Smoking status: Some Days     Current packs/day: 0.25     Types: Cigarettes    Smokeless tobacco: Never   Substance Use Topics    Alcohol use: Yes     Comment: Rarely    Drug use: No       Current Outpatient Medications on File Prior to Visit   Medication Sig Dispense Refill    FLUoxetine (PROZAC) 20 MG Cap Take 1 Capsule by mouth every day. (Patient not taking: Reported on 8/17/2023) 30 Capsule 2     No current facility-administered medications on file prior to visit.       No past medical history on file.        Review of Systems   Constitutional: Negative for fever.   HENT: Negative for sore throat.    Gastrointestinal: Positive for abdominal pain. Negative for nausea, diarrhea, constipation and hematochezia.   Genitourinary: Negative for dysuria and hematuria.   Neurological: denies headaches, dizziness  All other systems reviewed and are negative.         Objective:     /80   Pulse 82   Temp 36 °C (96.8 °F) (Temporal)   Resp 16   Ht 1.778 m (5' 10\")   Wt 73.5 kg (162 lb)   SpO2 98%         Physical Exam   Constitutional: pt appears well-developed. No distress.   HENT:   Nose: No nasal discharge.   Mouth/Throat: Mucous membranes are moist. Oropharynx is clear.   Eyes: Conjunctivae and EOM are normal. Pupils are equal, round, and reactive to light. Right eye exhibits no discharge. Left eye exhibits no " discharge.   Neck: Neck supple.   Cardiovascular: Normal rate, regular rhythm, S1 normal and S2 normal.    Pulmonary/Chest: Effort normal and breath sounds normal. There is normal air entry. No respiratory distress.   Abdominal: Soft. There is tenderness in the epigastric area.   BS are normal..    no HSM  Lymphadenopathy:     He has no cervical adenopathy.   Neurological: He is alert. No cranial nerve deficit.   Skin: Skin is warm and moist. No petechiae and no rash noted.   not diaphoretic. No jaundice.   Nursing note and vitals reviewed.              Assessment/Plan:         1. Gastroesophageal reflux disease without esophagitis   Advised to reduce ETOH use.     - omeprazole (PRILOSEC) 20 MG delayed-release capsule; Take 1 Capsule by mouth every day.  Dispense: 30 Capsule; Refill: 0  - Referral to establish with PCP    2. Hematemesis, unspecified whether nausea present   Likely secondary to #2  Hemodynamically stable.       - Referral to Gastroenterology  - CBC WITH DIFFERENTIAL; Future  - Comp Metabolic Panel; Future  - Referral to establish with PCP    3. Tobacco abuse     - Referral to establish with PCP  - buPROPion SR (ZYBAN) 150 MG SR tablet; Take 1 Tablet by mouth 2 times a day.  Dispense: 60 Tablet; Refill: 0          Differential diagnosis, natural history, supportive care, and indications for immediate follow-up discussed. All questions answered. Patient agrees with the plan of care.     Follow-up as needed if symptoms worsen or fail to improve to PCP, Urgent care or Emergency Room.     I have personally reviewed prior external notes and test results pertinent to today's visit.  I have independently reviewed and interpreted all diagnostics ordered during this urgent care acute visit.

## 2024-05-15 ENCOUNTER — NON-PROVIDER VISIT (OUTPATIENT)
Dept: URGENT CARE | Facility: PHYSICIAN GROUP | Age: 34
End: 2024-05-15

## 2024-05-15 DIAGNOSIS — Z02.1 PRE-EMPLOYMENT DRUG SCREENING: ICD-10-CM

## 2024-05-15 LAB
AMP AMPHETAMINE: NORMAL
COC COCAINE: NORMAL
INT CON NEG: NORMAL
INT CON POS: NORMAL
MET METHAMPHETAMINES: NORMAL
OPI OPIATES: NORMAL
PCP PHENCYCLIDINE: NORMAL
POC DRUG COMMENT 753798-OCCUPATIONAL HEALTH: NORMAL
THC: NORMAL

## 2024-05-15 PROCEDURE — 80305 DRUG TEST PRSMV DIR OPT OBS: CPT | Performed by: PHYSICIAN ASSISTANT

## 2024-09-01 ENCOUNTER — APPOINTMENT (OUTPATIENT)
Dept: RADIOLOGY | Facility: MEDICAL CENTER | Age: 34
End: 2024-09-01
Payer: MEDICAID

## 2024-09-28 ENCOUNTER — APPOINTMENT (OUTPATIENT)
Dept: RADIOLOGY | Facility: MEDICAL CENTER | Age: 34
End: 2024-09-28
Payer: MEDICAID

## 2024-12-12 ENCOUNTER — TELEPHONE (OUTPATIENT)
Dept: HEALTH INFORMATION MANAGEMENT | Facility: OTHER | Age: 34
End: 2024-12-12
Payer: MEDICAID